# Patient Record
Sex: FEMALE | Race: WHITE | NOT HISPANIC OR LATINO | Employment: FULL TIME | URBAN - METROPOLITAN AREA
[De-identification: names, ages, dates, MRNs, and addresses within clinical notes are randomized per-mention and may not be internally consistent; named-entity substitution may affect disease eponyms.]

---

## 2018-11-08 ENCOUNTER — HOSPITAL ENCOUNTER (EMERGENCY)
Facility: CLINIC | Age: 53
Discharge: HOME | End: 2018-11-08
Attending: EMERGENCY MEDICINE | Admitting: EMERGENCY MEDICINE
Payer: COMMERCIAL

## 2018-11-08 ENCOUNTER — APPOINTMENT (OUTPATIENT)
Dept: GENERAL RADIOLOGY | Facility: CLINIC | Age: 53
End: 2018-11-08
Attending: EMERGENCY MEDICINE
Payer: COMMERCIAL

## 2018-11-08 VITALS
OXYGEN SATURATION: 98 % | HEART RATE: 73 BPM | SYSTOLIC BLOOD PRESSURE: 152 MMHG | TEMPERATURE: 97.3 F | RESPIRATION RATE: 118 BRPM | DIASTOLIC BLOOD PRESSURE: 95 MMHG | WEIGHT: 228 LBS

## 2018-11-08 DIAGNOSIS — S82.392A CLOSED FRACTURE OF POSTERIOR MALLEOLUS OF LEFT TIBIA, INITIAL ENCOUNTER: ICD-10-CM

## 2018-11-08 PROCEDURE — 96374 THER/PROPH/DIAG INJ IV PUSH: CPT

## 2018-11-08 PROCEDURE — 99285 EMERGENCY DEPT VISIT HI MDM: CPT | Mod: 25

## 2018-11-08 PROCEDURE — 27825 TREAT LOWER LEG FRACTURE: CPT | Mod: LT

## 2018-11-08 PROCEDURE — 73600 X-RAY EXAM OF ANKLE: CPT | Mod: LT

## 2018-11-08 PROCEDURE — 25000128 H RX IP 250 OP 636: Performed by: EMERGENCY MEDICINE

## 2018-11-08 PROCEDURE — 25000131 ZZH RX MED GY IP 250 OP 636 PS 637: Performed by: EMERGENCY MEDICINE

## 2018-11-08 PROCEDURE — 96361 HYDRATE IV INFUSION ADD-ON: CPT

## 2018-11-08 PROCEDURE — 40000986 XR ANKLE LT 2 VW: Mod: LT

## 2018-11-08 RX ORDER — BUPIVACAINE HYDROCHLORIDE 5 MG/ML
INJECTION, SOLUTION PERINEURAL
Status: DISCONTINUED
Start: 2018-11-08 | End: 2018-11-08 | Stop reason: HOSPADM

## 2018-11-08 RX ORDER — ONDANSETRON 4 MG/1
4 TABLET, ORALLY DISINTEGRATING ORAL ONCE
Status: COMPLETED | OUTPATIENT
Start: 2018-11-08 | End: 2018-11-08

## 2018-11-08 RX ORDER — SODIUM CHLORIDE 9 MG/ML
1000 INJECTION, SOLUTION INTRAVENOUS CONTINUOUS
Status: DISCONTINUED | OUTPATIENT
Start: 2018-11-08 | End: 2018-11-08 | Stop reason: HOSPADM

## 2018-11-08 RX ORDER — HYDROMORPHONE HYDROCHLORIDE 1 MG/ML
0.5 INJECTION, SOLUTION INTRAMUSCULAR; INTRAVENOUS; SUBCUTANEOUS
Status: DISCONTINUED | OUTPATIENT
Start: 2018-11-08 | End: 2018-11-08 | Stop reason: HOSPADM

## 2018-11-08 RX ORDER — OXYCODONE AND ACETAMINOPHEN 5; 325 MG/1; MG/1
1-2 TABLET ORAL EVERY 4 HOURS PRN
Qty: 12 TABLET | Refills: 0 | Status: SHIPPED | OUTPATIENT
Start: 2018-11-08

## 2018-11-08 RX ADMIN — Medication 0.5 MG: at 18:46

## 2018-11-08 RX ADMIN — ONDANSETRON 4 MG: 4 TABLET, ORALLY DISINTEGRATING ORAL at 18:45

## 2018-11-08 RX ADMIN — Medication 0.5 MG: at 19:07

## 2018-11-08 RX ADMIN — SODIUM CHLORIDE 1000 ML: 9 INJECTION, SOLUTION INTRAVENOUS at 18:46

## 2018-11-08 ASSESSMENT — ENCOUNTER SYMPTOMS: ARTHRALGIAS: 1

## 2018-11-08 NOTE — ED AVS SNAPSHOT
[facility] Emergency Department    [address]    [facility] [address]    Phone:  [phone]    Fax:  [phone]                                       Mirna Méndez   MRN: [id number]    Department:  [facility] Emergency Department   Date of Visit:  11/8/2018           Patient Information     Date Of Birth          1965        Your diagnoses for this visit were:     Closed fracture of posterior malleolus of left tibia, initial encounter        You were seen by Roselia London MD.      Follow-up Information     Follow up with [facility] In 2 days.    Contact information:    [address]  Medical Records  [facility] [address]  [phone]          Follow up with [facility] In 3 days.    Contact information:    [address], [facility] [address]  [facility] [address]  [phone]          Discharge Instructions         Leg Fracture    You have a break (fracture) of the leg. A fracture is treated with a splint, cast, or special boot. It will usually take at about 8 to 12 weeks for the fracture to heal, but it can take several months in some cases. If you have a severe injury, you may need surgery to fix it.  Home care  Follow these guidelines when caring for yourself at home:    You will be given a splint, cast, boot, or other device to keep the injured area from moving. Unless you were told otherwise, use crutches or a walker. Don t put weight on the injured leg until your healthcare provider says you can do so. (You can rent crutches and a walker at many pharmacies and surgical or orthopedic supply stores.)    Keep your leg elevated to reduce pain and swelling. When sleeping, put a pillow under the injured leg. When sitting, support the injured leg so it is above your waist. This is very important during the first 2 days (48 hours).    Put an ice pack on the injured area. Do this for 20 minutes every 1 to 2 hours the first day  for pain relief. You can make an ice pack by wrapping a plastic bag of ice cubes in a thin towel. As the ice melts, be careful that the cast, splint, or boot doesn t get wet. You can put the ice pack directly over the splint or cast. Continue using the ice pack 3 to 4 times a day for the next 2 days. Then use the ice pack as needed to ease pain and swelling.    Keep the cast, splint, or boot completely dry at all times. Bathe with your cast, splint, or boot out of the water. Protect it with a large plastic bag, rubber-banded at the top end. If a boot or fiberglass cast or splint gets wet, you can dry it with a hair dryer.    You may use acetaminophen or ibuprofen to control pain, unless another pain medicine was prescribed. If you have chronic liver or kidney disease, talk with your healthcare provider before using these medicines. Also talk with your provider if you ve had a stomach ulcer or gastrointestinal bleeding.    Don t put creams or objects under the cast if you have itching.  Follow-up care  Follow up with your healthcare provider, or as advised. This is to make sure the bone is healing the way it should. If a splint was put on, it may be converted to a cast at your next visit.  X-rays may be taken. You will be told of any new findings that may affect your care.  When to seek medical advice  Call your healthcare provider right away if any of these occur:    The cast or splint cracks    The plaster cast or splint becomes wet or soft    The fiberglass cast or splint stays wet for more than 24 hours    Bad odor from the cast or wound fluid stains the cast    Tightness or pain under the cast or splint gets worse    Toes become swollen, cold, blue, numb, or tingly    You can t move your toes    Skin around cast or splint becomes red    Fever of 100.4 F (38 C) or higher, or as directed by your healthcare provider  Date Last Reviewed: 2/1/2017 2000-2018 [employer]. [address] [facility],  [place] [address]. All rights reserved. This information is not intended as a substitute for professional medical care. Always follow your healthcare professional's instructions.          Discharge Instructions: Caring for Your Splint  You will be going home with a splint. This is sometimes called a removable cast. A splint helps your body heal by holding your injured bones or joints in place. Take good care of your splint. A damaged splint can keep your injury from healing well. If your splint becomes damaged or loses its shape, you may need to replace it.   You have a broken ___________________ bone.  This bone is located in your ____________.   Home care    Wear your splint according to your doctor s instructions.    Keep the splint dry at all times. Bathe with your splint well out of the water. You can hold the splint outside the tub or shower when bathing. Protect it with a large plastic bag closed at the top end with a rubber band. Use two layers of plastic to help keep the splint dry. Or you can buy a waterproof shield.    If a splint gets wet, dry it with a hair dryer on the  cool  setting. Don t use the warm or hot setting, because those settings can burn your skin.    Always keep the splint clean and away from dirt.    Wash the Velcro straps and inner cloth sleeve (stockinet) with soapy water and air dry.    Keep your splint away from open flames.    Don t expose your splint to heat, space heaters, or prolonged sunlight. Excessive heat will cause the splint to change shape.    Don t cut or tear the splint.     Exercise all the nearby joints not kept still by the splint. If you have a long leg splint, exercise your hip joint and your toes. If you have an arm splint, exercise your shoulder, elbow, thumb, and fingers.    Elevate the part of your body that is in the splint. This helps reduce swelling.  Follow-up care  Make a follow-up appointment with your healthcare provider, or as advised.  When to call your  healthcare provider  Call your healthcare provider right away if you have any of these:    Tingling or numbness in the affected area    Severe pain that cannot be relieved with medicine    Cast that feels too tight or too loose    Swelling, coldness, or blue-gray color in the fingers or toes    Cast that is damaged, cracked, or has rough edges that hurt    Pressure sores or red marks that don t go away within 1 hour after removing the splint    Blisters   Date Last Reviewed: 7/1/2016 2000-2018 [employer]. [address] [place] [address]. All rights reserved. This information is not intended as a substitute for professional medical care. Always follow your healthcare professional's instructions.          24 Hour Appointment Hotline       To make an appointment at any [facility], call [phone] ([phone]). If you don't have a family doctor or clinic, we will help you find one. [facility] are conveniently located to serve the needs of you and your family.             Review of your medicines      START taking        Dose / Directions Last dose taken    oxyCODONE-acetaminophen 5-325 MG per tablet   Commonly known as:  PERCOCET   Dose:  1-2 tablet   Quantity:  12 tablet        Take 1-2 tablets by mouth every 4 hours as needed for pain   Refills:  0          Our records show that you are taking the medicines listed below. If these are incorrect, please call your family doctor or clinic.        Dose / Directions Last dose taken    AMITRIPTYLINE HCL PO        Refills:  0        EFFEXOR PO        Take by mouth 3 times daily   Refills:  0        levothyroxine 25 mcg/mL Susp   Commonly known as:  SYNTHROID        Take by mouth every morning (before breakfast)   Refills:  0                Information about OPIOIDS     PRESCRIPTION OPIOIDS: WHAT YOU NEED TO KNOW   We gave you an opioid (narcotic) pain medicine. It is important to manage your pain, but opioids are not always the  best choice. You should first try all the other options your care team gave you. Take this medicine for as short a time (and as few doses) as possible.    Some activities can increase your pain, such as bandage changes or therapy sessions. It may help to take your pain medicine 30 to 60 minutes before these activities. Reduce your stress by getting enough sleep, working on hobbies you enjoy and practicing relaxation or meditation. Talk to your care team about ways to manage your pain beyond prescription opioids.    These medicines have risks:    DO NOT drive when on new or higher doses of pain medicine. These medicines can affect your alertness and reaction times, and you could be arrested for driving under the influence (DUI). If you need to use opioids long-term, talk to your care team about driving.    DO NOT operate heavy machinery    DO NOT do any other dangerous activities while taking these medicines.    DO NOT drink any alcohol while taking these medicines.     If the opioid prescribed includes acetaminophen, DO NOT take with any other medicines that contain acetaminophen. Read all labels carefully. Look for the word  acetaminophen  or  Tylenol.  Ask your pharmacist if you have questions or are unsure.    You can get addicted to pain medicines, especially if you have a history of addiction (chemical, alcohol or substance dependence). Talk to your care team about ways to reduce this risk.    All opioids tend to cause constipation. Drink plenty of water and eat foods that have a lot of fiber, such as fruits, vegetables, prune juice, apple juice and high-fiber cereal. Take a laxative (Miralax, milk of magnesia, Colace, Senna) if you don t move your bowels at least every other day. Other side effects include upset stomach, sleepiness, dizziness, throwing up, tolerance (needing more of the medicine to have the same effect), physical dependence and slowed breathing.    Store your pills in a secure place, locked if  possible. We will not replace any lost or stolen medicine. If you don t finish your medicine, please throw away (dispose) as directed by your pharmacist. The [place] [employer]ution Control Agency has more information about safe disposal: https://www.pca.state.[place]/living-green/managing-unwanted-medications        Prescriptions were sent or printed at these locations (1 Prescription)                   Other Prescriptions                Printed at Department/Unit printer (1 of 1)         oxyCODONE-acetaminophen (PERCOCET) 5-325 MG per tablet                Procedures and tests performed during your visit     Procedure/Test Number of Times Performed    XR Ankle Left 2 Views 2      Orders Needing Specimen Collection     None      Pending Results     Date and Time Order Name Status Description    11/8/2018 1927 XR Ankle Left 2 Views Preliminary     11/8/2018 1842 XR Ankle Left 2 Views Preliminary             Pending Culture Results     No orders found from 11/6/2018 to 11/9/2018.            Pending Results Instructions     If you had any lab results that were not finalized at the time of your Discharge, you can call the ED Lab Result RN at [phone]. You will be contacted by this team for any positive Lab results or changes in treatment. The nurses are available 7 days a week from 10A to 6:30P.  You can leave a message 24 hours per day and they will return your call.        Test Results From Your Hospital Stay        11/8/2018  7:06 PM      Narrative     LEFT ANKLE TWO VIEWS     11/8/2018 7:03 PM      HISTORY: Fall, deformity.    COMPARISON: None.        Impression     IMPRESSION: Mildly displaced fracture of the posterior malleolus of  the distal tibia. The ankle mortise is widened medially.                11/8/2018  7:57 PM      Narrative     LEFT ANKLE TWO VIEWS    11/8/2018 7:53 PM     COMPARISON: Prereduction three view left ankle same day.    HISTORY: Post reduction.        Impression     IMPRESSION: Left ankle is  now in a splint. Fracture through the  posterior malleolus again noted. Mild widening of the medial aspect of  the ankle mortise joint is noted but has improved significantly from  the prereduction images. No new fractures.                  Clinical Quality Measure: Blood Pressure Screening     Your blood pressure was checked while you were in the emergency department today. The last reading we obtained was  BP: (!) 152/95 . Please read the guidelines below about what these numbers mean and what you should do about them.  If your systolic blood pressure (the top number) is less than 120 and your diastolic blood pressure (the bottom number) is less than 80, then your blood pressure is normal. There is nothing more that you need to do about it.  If your systolic blood pressure (the top number) is 120-139 or your diastolic blood pressure (the bottom number) is 80-89, your blood pressure may be higher than it should be. You should have your blood pressure rechecked within a year by a primary care provider.  If your systolic blood pressure (the top number) is 140 or greater or your diastolic blood pressure (the bottom number) is 90 or greater, you may have high blood pressure. High blood pressure is treatable, but if left untreated over time it can put you at risk for heart attack, stroke, or kidney failure. You should have your blood pressure rechecked by a primary care provider within the next 4 weeks.  If your provider in the emergency department today gave you specific instructions to follow-up with your doctor or provider even sooner than that, you should follow that instruction and not wait for up to 4 weeks for your follow-up visit.        Thank you for choosing [facility]       Thank you for choosing [facility] for your care. Our goal is always to provide you with excellent care. Hearing back from our patients is one way we can continue to improve our services. Please take a few minutes to complete the written survey  "that you may receive in the mail after you visit with us. Thank you!        [employer] [employer] Information     [employer] lets you send messages to your doctor, view your test results, renew your prescriptions, schedule appointments and more. To sign up, go to [email or web address] . Click on \"Log in\" on the left side of the screen, which will take you to the Welcome page. Then click on \"Sign up Now\" on the right side of the page.     You will be asked to enter the access code listed below, as well as some personal information. Please follow the directions to create your username and password.     Your access code is: [email or web address]  Expires: 2019  9:04 PM     Your access code will  in 90 days. If you need help or a new code, please call your [facility] clinic or [phone].        Care EveryWhere ID     This is your Care EveryWhere ID. This could be used by other organizations to access your [facility] medical records  [id number]        Equal Access to Services     MARCUS MATSON : Hadraymond israelo Soannika, waaxda luqadaha, qaybta kaalmada alin, philipp owen. So [facility] [phone].    ATENCIÓN: Si habla español, tiene a flores disposición servicios gratuitos de asistencia lingüística. Llame al [phone].    We comply with applicable federal civil rights laws and [place] laws. We do not discriminate on the basis of race, color, national origin, age, disability, sex, sexual orientation, or gender identity.            After Visit Summary       This is your record. Keep this with you and show to your community pharmacist(s) and doctor(s) at your next visit.                  "

## 2018-11-08 NOTE — ED AVS SNAPSHOT
[facility] Emergency Department    [address]    [facility] [address]    Phone:  [phone]    Fax:  [phone]                                       Mirna Méndez   MRN: [id number]    Department:  [facility] Emergency Department   Date of Visit:  11/8/2018           After Visit Summary Signature Page     I have received my discharge instructions, and my questions have been answered. I have discussed any challenges I see with this plan with the nurse or doctor.    ..........................................................................................................................................  Patient/Patient Representative Signature      ..........................................................................................................................................  Patient Representative Print Name and Relationship to Patient    ..................................................               ................................................  Date                                   Time    ..........................................................................................................................................  Reviewed by Signature/Title    ...................................................              ..............................................  Date                                               Time          22EPIC Rev 08/18

## 2018-11-09 NOTE — ED TRIAGE NOTES
Pt presents with having left ankle pain slipped on the wet leaves, Now presents with left deformed ankle. ABC's intact A&Ox.4

## 2018-11-09 NOTE — ED PROVIDER NOTES
History     Chief Complaint:  Fall    HPI   Mirna Méndez is a 52 year old female who presents to the emergency department today with fall. The patient fell while walking her dog and hurt her left ankle with obvious deformity. She rates her pain as 2/10. She does report she always has foot numbness after spine surgery. She reports some numbness currently, but not new. She denies other injury. She is otherwise healthy. Denies any symptoms before the fall.     Allergies:  No Known Drug Allergies      Medications:    Amitriptyline   Synthroid   Effexor     Past Medical History:    Knee pain  Other postprocedural status     Past Surgical History:    Spine surgery    Family History:    History reviewed. No pertinent family history.     Social History:  The patient was alone.  Marital Status:       Review of Systems   Musculoskeletal: Positive for arthralgias (left ankle).   All other systems reviewed and are negative.    Physical Exam     Patient Vitals for the past 24 hrs:   BP Temp Pulse Heart Rate Resp SpO2 Weight   11/08/18 2015 - - - - - 100 % -   11/08/18 1906 - - - - - 99 % -   11/08/18 1905 (!) 152/95 - - - - - -   11/08/18 1833 (!) 163/101 97.3  F (36.3  C) 73 73 (!) 118 100 % 103.4 kg (228 lb)      Physical Exam    General: Patient is alert and interactive when I enter the room  Head:  The scalp, face, and head appear normal  Eyes:  Conjunctivae are normal  ENT:    The nose is normal    Pinnae are normal    External acoustic canals are normal  Neck:  Trachea midline  CV:  Pulses are normal.    Resp:  No respiratory distress   Abdomen:      Soft, non-tender, non-distended  Musc:  Normal muscular tone    Left ankle deformity with ankle twisted laterally     2+ DP pulse, able to move toes    No lacerations    Large effusion to left ankle  Skin:  No rash or lesions noted  Neuro:  Speech is normal and fluent. Face is symmetric.     Moving all extremities well.   Psych: Awake. Alert.  Normal affect.   Appropriate interactions.         Emergency Department Course   Imaging:  Radiology findings were communicated with the patient who voiced understanding of the findings.  Left Ankle XR  IMPRESSION: Mildly displaced fracture of the posterior malleolus of the distal tibia. The ankle mortise is widened medially.   Report per radiology      Left Ankle XR Repeat   IMPRESSION: Left ankle is now in a splint. Fracture through the posterior malleolus again noted. Mild widening of the medial aspect of the ankle mortise joint is noted but has improved significantly from  the prereduction images. No new fractures.    Report per radiology      Procedures:    Reduction     SITE:Left ankle     PROCEDURE PROVIDER: Dr. London        CONSENT: Risks , benefits, and  alternatives were discussed with   patient and consent for procedure was  obtained verbally.       Anesthesia: Left hematoma block with bupivacaine 1%  REDUCTION COMMENTS: The patient's left ankle was held in traction and internally rotated until achieved anatomic reduction. Post reductions X-rays were obtained and showed good reduction.       PATIENT STATUS: Dislocation alignment improved post procedure   and both sensation and circulation are intact     Procedure Note: Splint Placement  Physician: Roselia London MD  Diagnosis: left azam fracture.    Description of Procedure:  Using tech to assist, the left lower extremity was placed in a RJ splint with plaster material.  The neurovascular exam was normal before and after splint placement.  The patient tolerated the procedure well, there were no complications.       Interventions:  1845: Zofran 4mg IV   1846: 0.9% NaCl 1L IV Bolus   1907: Dilaudid 0.5mg IV      Emergency Department Course:  Nursing notes and vitals reviewed.  1840: I performed an exam of the patient as documented above.   The patient was sent for a Left Ankle XR while in the emergency department, results above.   Patient rechecked and updated.   The  patient was sent for a  Left Ankle XR while in the emergency department, results above.   2103: Findings and plan explained to the Patient. Patient discharged home with instructions regarding supportive care, medications, and reasons to return. The importance of close follow-up was reviewed. The patient was prescribed Percocet.    I personally reviewed the laboratory and imaging results with the Patient and answered all related questions prior to discharge.   Impression & Plan    Medical Decision Making:  Mirna Méndez is a 52 year old female who presents for evaluation of a fall.  This was clearly a mechanical fall, not syncope.  There were no prodromal symptoms so I doubt stroke, cardiac arrhythmia or other serious etiology.  Detailed exam shows a left ankle deformity. She is neurovascular intact. XR of Left Ankle showed mildly displaced fracture of the posterior malleolus of the distal tibia and medially widened ankle mortise. This was reduced as per procedure note above.  I had the tech ambulate the patient and she did well.  Based on this I would send home for outpatient management with orthopedics. With the widened joint, it possible she will get surgery. Instructed to be NWB..  I would not do workup for syncope, stroke, ACS, PE at this point.  I doubt serious underlying fractures, intracerebral issues, spinal fractures.      Diagnosis:    ICD-10-CM    1. Closed fracture of posterior malleolus of left tibia, initial encounter S82.392A        Disposition:  discharged to home    Discharge Medications:  New Prescriptions    OXYCODONE-ACETAMINOPHEN (PERCOCET) 5-325 MG PER TABLET    Take 1-2 tablets by mouth every 4 hours as needed for pain       Scribe Disclosure:  I, Hailey Jaramillo, am serving as a scribe at 6:40 PM on 11/8/2018 to document services personally performed by Roselia London MD based on my observations and the provider's statements to me.    11/8/2018   [facility] EMERGENCY  DEPARTMENT       Roselia London MD  11/12/18 0168

## 2018-11-09 NOTE — DISCHARGE INSTRUCTIONS
Leg Fracture    You have a break (fracture) of the leg. A fracture is treated with a splint, cast, or special boot. It will usually take at about 8 to 12 weeks for the fracture to heal, but it can take several months in some cases. If you have a severe injury, you may need surgery to fix it.  Home care  Follow these guidelines when caring for yourself at home:    You will be given a splint, cast, boot, or other device to keep the injured area from moving. Unless you were told otherwise, use crutches or a walker. Don t put weight on the injured leg until your healthcare provider says you can do so. (You can rent crutches and a walker at many pharmacies and surgical or orthopedic supply stores.)    Keep your leg elevated to reduce pain and swelling. When sleeping, put a pillow under the injured leg. When sitting, support the injured leg so it is above your waist. This is very important during the first 2 days (48 hours).    Put an ice pack on the injured area. Do this for 20 minutes every 1 to 2 hours the first day for pain relief. You can make an ice pack by wrapping a plastic bag of ice cubes in a thin towel. As the ice melts, be careful that the cast, splint, or boot doesn t get wet. You can put the ice pack directly over the splint or cast. Continue using the ice pack 3 to 4 times a day for the next 2 days. Then use the ice pack as needed to ease pain and swelling.    Keep the cast, splint, or boot completely dry at all times. Bathe with your cast, splint, or boot out of the water. Protect it with a large plastic bag, rubber-banded at the top end. If a boot or fiberglass cast or splint gets wet, you can dry it with a hair dryer.    You may use acetaminophen or ibuprofen to control pain, unless another pain medicine was prescribed. If you have chronic liver or kidney disease, talk with your healthcare provider before using these medicines. Also talk with your provider if you ve had a stomach ulcer or  gastrointestinal bleeding.    Don t put creams or objects under the cast if you have itching.  Follow-up care  Follow up with your healthcare provider, or as advised. This is to make sure the bone is healing the way it should. If a splint was put on, it may be converted to a cast at your next visit.  X-rays may be taken. You will be told of any new findings that may affect your care.  When to seek medical advice  Call your healthcare provider right away if any of these occur:    The cast or splint cracks    The plaster cast or splint becomes wet or soft    The fiberglass cast or splint stays wet for more than 24 hours    Bad odor from the cast or wound fluid stains the cast    Tightness or pain under the cast or splint gets worse    Toes become swollen, cold, blue, numb, or tingly    You can t move your toes    Skin around cast or splint becomes red    Fever of 100.4 F (38 C) or higher, or as directed by your healthcare provider  Date Last Reviewed: 2/1/2017 2000-2018 [employer]. [address] [place] [address]. All rights reserved. This information is not intended as a substitute for professional medical care. Always follow your healthcare professional's instructions.          Discharge Instructions: Caring for Your Splint  You will be going home with a splint. This is sometimes called a removable cast. A splint helps your body heal by holding your injured bones or joints in place. Take good care of your splint. A damaged splint can keep your injury from healing well. If your splint becomes damaged or loses its shape, you may need to replace it.   You have a broken ___________________ bone.  This bone is located in your ____________.   Home care    Wear your splint according to your doctor s instructions.    Keep the splint dry at all times. Bathe with your splint well out of the water. You can hold the splint outside the tub or shower when bathing. Protect it with a large plastic bag  closed at the top end with a rubber band. Use two layers of plastic to help keep the splint dry. Or you can buy a waterproof shield.    If a splint gets wet, dry it with a hair dryer on the  cool  setting. Don t use the warm or hot setting, because those settings can burn your skin.    Always keep the splint clean and away from dirt.    Wash the Velcro straps and inner cloth sleeve (stockinet) with soapy water and air dry.    Keep your splint away from open flames.    Don t expose your splint to heat, space heaters, or prolonged sunlight. Excessive heat will cause the splint to change shape.    Don t cut or tear the splint.     Exercise all the nearby joints not kept still by the splint. If you have a long leg splint, exercise your hip joint and your toes. If you have an arm splint, exercise your shoulder, elbow, thumb, and fingers.    Elevate the part of your body that is in the splint. This helps reduce swelling.  Follow-up care  Make a follow-up appointment with your healthcare provider, or as advised.  When to call your healthcare provider  Call your healthcare provider right away if you have any of these:    Tingling or numbness in the affected area    Severe pain that cannot be relieved with medicine    Cast that feels too tight or too loose    Swelling, coldness, or blue-gray color in the fingers or toes    Cast that is damaged, cracked, or has rough edges that hurt    Pressure sores or red marks that don t go away within 1 hour after removing the splint    Blisters   Date Last Reviewed: 7/1/2016 2000-2018 [employer]. [address] [place] [address], [place] [address]. All rights reserved. This information is not intended as a substitute for professional medical care. Always follow your healthcare professional's instructions.

## 2018-11-10 ENCOUNTER — HEALTH MAINTENANCE LETTER (OUTPATIENT)
Age: 53
End: 2018-11-10

## 2018-12-18 ENCOUNTER — THERAPY VISIT (OUTPATIENT)
Dept: PHYSICAL THERAPY | Facility: CLINIC | Age: 53
End: 2018-12-18
Payer: COMMERCIAL

## 2018-12-18 DIAGNOSIS — Z98.890 STATUS POST SURGERY: ICD-10-CM

## 2018-12-18 DIAGNOSIS — M25.572 ACUTE LEFT ANKLE PAIN: ICD-10-CM

## 2018-12-18 PROCEDURE — 97161 PT EVAL LOW COMPLEX 20 MIN: CPT | Mod: GP | Performed by: PHYSICAL THERAPIST

## 2018-12-18 PROCEDURE — 97110 THERAPEUTIC EXERCISES: CPT | Mod: GP | Performed by: PHYSICAL THERAPIST

## 2018-12-18 NOTE — LETTER
[facility] PHYSICAL THERAPY  [address]  [facility] [address]  [phone]    2018    Re: Mirna Méndez   :   1965  MRN:  [id number]   REFERRING PHYSICIAN:   Bora Burgos    [facility]THLETIC [facility] PHYSICAL [facility]  Date of Initial Evaluation:  18  Visits:  Rxs Used: 1  Reason for Referral:  Acute left ankle pain; Status post surgery    [facility] Initial Evaluation  Subjective:  The history is provided by the patient. No  was used.         Objective:  Gait:    Gait Type:  Antalgic   Weight Bearing Status:  WBAT   Assistive Devices:  CAM    Ankle/Foot Evaluation  ROM:    PROM:    Dorsiflexion:  Left:    2     Right:   20   Plantarflexion:  Left:    35     Right:  60  PALPATION: normal    Assessment/Plan:    Patient is a 52 year old female with left side ankle complaints.    Patient has the following significant findings with corresponding treatment plan.                Diagnosis 1:  S/p L ankle  ORIF  Pain -  self management, education, directional preference exercise and home program  Decreased ROM/flexibility - manual therapy and therapeutic exercise  Decreased strength - therapeutic exercise and therapeutic activities  Impaired balance - neuro re-education and therapeutic activities  Decreased proprioception - neuro re-education and therapeutic activities  Impaired muscle performance - neuro re-education  Decreased function - therapeutic activities    Therapy Evaluation Codes:   1) History comprised of:   Personal factors that impact the plan of care:      None.    Comorbidity factors that impact the plan of care are:      None.     Medications impacting care: Anti-depressant, Pain and Sleep.  2) Examination of Body Systems comprised of:   Body structures and functions that impact the plan of care:      Ankle.   Activity limitations that impact the plan of  care are:      Bending, Lifting, Stairs and Walking.  Re: Mirna Méndez   :   1965            3) Clinical presentation characteristics are:   Stable/Uncomplicated.  4) Decision-Making    Low complexity using standardized patient assessment instrument and/or measureable assessment of functional outcome.  Cumulative Therapy Evaluation is: Low complexity.    Previous and current functional limitations:  (See Goal Flow Sheet for this information)    Short term and Long term goals: (See Goal Flow Sheet for this information)     Communication ability:  Patient appears to be able to clearly communicate and understand verbal and written communication and follow directions correctly.  Treatment Explanation - The following has been discussed with the patient:   RX ordered/plan of care  Anticipated outcomes  Possible risks and side effects  This patient would benefit from PT intervention to resume normal activities.   Rehab potential is good.    Frequency:  1 X week, once daily  Duration:  for 8 weeks  Discharge Plan:  Achieve all LTG.  Independent in home treatment program.  Reach maximal therapeutic benefit.    Thank you for your referral.    INQUIRIES  Therapist: Tigre Roman, PT  [facility] PH[facility]ICAL THERAPY  [address] [place] [id number]  Phone: [phone]  Fax: [phone]

## 2018-12-18 NOTE — LETTER
[facility] ATHLETIC [facility] PHYSICAL THERAPY  [address]  [facility] [address]  [phone]    2018    Re: Mirna Méndez   :   1965  MRN:  [id number]   REFERRING PHYSICIAN:   Bora Burgos    [facility] ATHLETIC [facility] PHYSICAL THERAPY  Date of Initial Evaluation:  18  Visits:  Rxs Used: 1  Reason for Referral:  Acute left ankle pain; Status post surgery    EVALUATION SUMMARY    [facility] Initial Evaluation  Subjective:  The history is provided by the patient. No  was used.   Mirna Méndez is a 52 year old female with a left ankle condition.  Condition occurred with:  A fall/slip.  Condition occurred: at home.  This is a new condition  Pt had ORIF on 18 after falling and fracturing left ankle at home.      Patient reports pain:  Joint.  Radiates to:  Ankle.  Pain is described as aching and is intermittent and reported as 6/10.  Associated symptoms:  Loss of motion/stiffness and loss of strength. Pain is worse during the day.  Symptoms are exacerbated by activity, walking and standing and relieved by ice and rest.  Since onset symptoms are gradually improving.    Previous treatment includes surgery.  There was moderate improvement following previous treatment.  General health as reported by patient is good.  Pertinent medical history includes:  Depression and migraines/headaches.  Medical allergies: no.  Other surgeries include:  Orthopedic surgery.  Current medications:  Anti-depressants, pain medication and sleep medication.  Current occupation is Business owner.  Patient is working in normal job without restrictions.  Primary job tasks include:  Prolonged standing and repetitive tasks.  Barriers include:  None as reported by patient.                Objective:  Gait:    Gait Type:  Antalgic   Weight Bearing Status:  WBAT   Assistive Devices:  CAM    Ankle/Foot Evaluation  ROM:     PROM:    Dorsiflexion:  Left:    2     Right:   20   Plantarflexion:  Left:    35     Right:  60  PALPATION: normal    Assessment/Plan:    Patient is a 52 year old female with left side ankle complaints.    Patient has the following significant findings with corresponding treatment plan.                Diagnosis 1:  S/p L ankle  ORIF  Pain -  self management, education, directional preference exercise and home program  Decreased ROM/flexibility - manual therapy and therapeutic exercise  Decreased strength - therapeutic exercise and therapeutic activities  Impaired balance - neuro re-education and therapeutic activities  Re: Mirna Méndez   :   1965        Decreased proprioception - neuro re-education and therapeutic activities  Impaired muscle performance - neuro re-education  Decreased function - therapeutic activities    Therapy Evaluation Codes:   1) History comprised of:   Personal factors that impact the plan of care:      None.    Comorbidity factors that impact the plan of care are:      None.     Medications impacting care: Anti-depressant, Pain and Sleep.  2) Examination of Body Systems comprised of:   Body structures and functions that impact the plan of care:      Ankle.   Activity limitations that impact the plan of care are:      Bending, Lifting, Stairs and Walking.  3) Clinical presentation characteristics are:   Stable/Uncomplicated.  4) Decision-Making    Low complexity using standardized patient assessment instrument and/or measureable assessment of functional outcome.  Cumulative Therapy Evaluation is: Low complexity.    Previous and current functional limitations:  (See Goal Flow Sheet for this information)    Short term and Long term goals: (See Goal Flow Sheet for this information)     Communication ability:  Patient appears to be able to clearly communicate and understand verbal and written communication and follow directions correctly.  Treatment Explanation - The following has  been discussed with the patient:   RX ordered/plan of care  Anticipated outcomes  Possible risks and side effects  This patient would benefit from PT intervention to resume normal activities.   Rehab potential is good.    Frequency:  1 X week, once daily  Duration:  for 8 weeks  Discharge Plan:  Achieve all LTG.  Independent in home treatment program.  Reach maximal therapeutic benefit.    Thank you for your referral.    INQUIRIES  Therapist: Tigre Roman, PT  [facility]  [address] [place] [id number]  Phone: [phone]  Fax: [phone]

## 2018-12-18 NOTE — PROGRESS NOTES
[facility] Initial [facility]  Subjective:  The history is provided by the patient. No  was used.   Mirna Méndez is a 52 year old female with a left ankle condition.  Condition occurred with:  A fall/slip.  Condition occurred: at home.  This is a new condition  Pt had ORIF on 11/16/18 after falling and fracturing left ankle at home.  .    Patient reports pain:  Joint.  Radiates to:  Ankle.  Pain is described as aching and is intermittent and reported as 6/10.  Associated symptoms:  Loss of motion/stiffness and loss of strength. Pain is worse during the day.  Symptoms are exacerbated by activity, walking and standing and relieved by ice and rest.  Since onset symptoms are gradually improving.    Previous treatment includes surgery.  There was moderate improvement following previous treatment.  General health as reported by patient is good.  Pertinent medical history includes:  Depression and migraines/headaches.  Medical allergies: no.  Other surgeries include:  Orthopedic surgery.  Current medications:  Anti-depressants, pain medication and sleep medication.  Current occupation is Business owner  .  Patient is working in normal job without restrictions.  Primary job tasks include:  Prolonged standing and repetitive tasks.    Barriers include:  None as reported by patient.                            Objective:    Gait:    Gait Type:  Antalgic   Weight Bearing Status:  WBAT   Assistive Devices:  CAM            Ankle/Foot Evaluation  ROM:      PROM:    Dorsiflexion:  Left:    2     Right:   20   Plantarflexion:  Left:    35     Right:  60                    PALPATION: normal                                                          General     ROS    Assessment/Plan:    Patient is a 52 year old female with left side ankle complaints.    Patient has the following significant findings with corresponding treatment plan.                Diagnosis 1:  S/p L ankle  ORIF  Pain -  self  management, education, directional preference exercise and home program  Decreased ROM/flexibility - manual therapy and therapeutic exercise  Decreased strength - therapeutic exercise and therapeutic activities  Impaired balance - neuro re-education and therapeutic activities  Decreased proprioception - neuro re-education and therapeutic activities  Impaired muscle performance - neuro re-education  Decreased function - therapeutic activities    Therapy Evaluation Codes:   1) History comprised of:   Personal factors that impact the plan of care:      None.    Comorbidity factors that impact the plan of care are:      None.     Medications impacting care: Anti-depressant, Pain and Sleep.  2) Examination of Body Systems comprised of:   Body structures and functions that impact the plan of care:      Ankle.   Activity limitations that impact the plan of care are:      Bending, Lifting, Stairs and Walking.  3) Clinical presentation characteristics are:   Stable/Uncomplicated.  4) Decision-Making    Low complexity using standardized patient assessment instrument and/or measureable assessment of functional outcome.  Cumulative Therapy Evaluation is: Low complexity.    Previous and current functional limitations:  (See Goal Flow Sheet for this information)    Short term and Long term goals: (See Goal Flow Sheet for this information)     Communication ability:  Patient appears to be able to clearly communicate and understand verbal and written communication and follow directions correctly.  Treatment Explanation - The following has been discussed with the patient:   RX ordered/plan of care  Anticipated outcomes  Possible risks and side effects  This patient would benefit from PT intervention to resume normal activities.   Rehab potential is good.    Frequency:  1 X week, once daily  Duration:  for 8 weeks  Discharge Plan:  Achieve all LTG.  Independent in home treatment program.  Reach maximal therapeutic benefit.    Please  refer to the daily flowsheet for treatment today, total treatment time and time spent performing 1:1 timed codes.

## 2019-01-17 PROBLEM — Z98.890 STATUS POST SURGERY: Status: RESOLVED | Noted: 2018-12-18 | Resolved: 2019-01-17

## 2019-01-17 PROBLEM — M25.572 ACUTE LEFT ANKLE PAIN: Status: RESOLVED | Noted: 2018-12-18 | Resolved: 2019-01-17

## 2022-05-20 ENCOUNTER — THERAPY VISIT (OUTPATIENT)
Dept: PHYSICAL THERAPY | Facility: CLINIC | Age: 57
End: 2022-05-20
Payer: COMMERCIAL

## 2022-05-20 DIAGNOSIS — M54.2 NECK PAIN: ICD-10-CM

## 2022-05-20 PROCEDURE — 97110 THERAPEUTIC EXERCISES: CPT | Mod: GP | Performed by: PHYSICAL THERAPIST

## 2022-05-20 PROCEDURE — 97161 PT EVAL LOW COMPLEX 20 MIN: CPT | Mod: GP | Performed by: PHYSICAL THERAPIST

## 2022-05-20 NOTE — PROGRESS NOTES
[facility]    OUTPATIENT Physical Therapy ORTHOPEDIC EVALUATION  PLAN OF TREATMENT FOR OUTPATIENT REHABILITATION  (COMPLETE FOR INITIAL CLAIMS ONLY)  Patient's Last Name, First Name, M.I.  YOB: 1965  Mirna Méndez    Provider s Name:  [facility]   Medical Record No.  [id number]   Start of Care Date:  05/20/22   Onset Date:   04/13/22   Type:     _X__PT   ___OT Medical Diagnosis:    Encounter Diagnosis   Name Primary?    Neck pain         Treatment Diagnosis:  R neck pain with R UE radiculopathy        Goals:     05/20/22 0500   Body Part   Goals listed below are for shoulder/neck pain   Goal #1   Goal #1 driving/transportation   Previous Functional Level No restrictions   Current Functional Level Drives using side and rearview mirrors   Performance Level 20 min with pain 9/10   STG Target Performance Able to look over either shoulder    Performance Level pain 5/10   Rationale for safe driving   Due date 06/17/22   LTG Target Performance Able to look over either shoulder    Performance Level pain 2/10   Rationale for safe driving   Due date 07/15/22       Therapy Frequency:  1 x week  Predicted Duration of Therapy Intervention:  8 weeks    Tigre Roman, LISBET                 I CERTIFY THE NEED FOR THESE SERVICES FURNISHED UNDER        THIS PLAN OF TREATMENT AND WHILE UNDER MY CARE .             Physician Signature               Date    X_____________________________________________________                         Certification Date From:  05/20/22   Certification Date To:  07/15/22    Referring Provider:  Francis Wood MD    Initial Assessment        See Epic Evaluation SOC Date: 05/20/22

## 2022-05-20 NOTE — PROGRESS NOTES
Physical Therapy Initial Evaluation  Subjective:  The history is provided by the patient. No  was used.   Patient Health History  Mirna Méndez being seen for R neck pain .     Date of Onset: 3 months ago.   Problem occurred: Repetative use, cooking   Pain is reported as 0/10 on pain scale.  General health as reported by patient is good.  Pertinent medical history includes: depression, mental illness, migraines/headaches and thyroid problems.   Red flags:  Foot drop.  Medical allergies: none.   Surgeries include:  Orthopedic surgery. Other surgery history details: foot, ankle knee and back.    Current medications:  Anti-depressants, pain medication, sleep medication and thyroid medication.    Current occupation is none.   Primary job tasks include:  Computer work, prolonged standing and repetitive tasks.                  Therapist Generated HPI Evaluation  Problem details: Pt. complains of Right neck pain with R UE radiculopathy that has been present for 3 months.  No known trauma.  PT order dated 4/13/22.      .         Type of problem:  Cervical spine.    This is a chronic condition.  Condition occurred with:  Insidious onset.  Where condition occurred: for unknown reasons.  Patient reports pain:  Cervical right side.  Pain is described as aching and is intermittent.  Pain radiates to:  Hand right. Pain is worse during the day.  Since onset symptoms are unchanged.  Associated symptoms:  Loss of motion/stiffness and loss of strength. Symptoms are exacerbated by looking up or down, driving and rotating head  and relieved by rest.      Barriers include:  None as reported by patient.                        Objective:  Standing Alignment:    Cervical/Thoracic:  Forward head  Shoulder/UE:  Rounded shoulders                  Flexibility/Screens:   Positive screens:  CervicalNegative screens: Shoulder       Spine:  Decreased left spine flexibility:  Levator    Decreased right spine flexibility:   Levator                  Cervical/Thoracic Evaluation    AROM:  AROM Cervical:    Flexion:            100%  Extension:       75% with R neck pian  Rotation:         Left: 100%     Right: 75% with ERP  Side Bend:      Left: 100%     Right:  50% with increased R UE sx's      Headaches: cervical  Cervical Myotomes:  normal                    Neural Tension:    Left side:  Median positive.  Left side:  Radial and Ulnar  negative.    Right side:  Radial; Ulnar and Median  negative.  Cervical Dermatomes:  normal                    Cervical Palpation:  normal      Functional Tests:  normal        Cord Sign:  normal                                            General     ROS    Assessment/Plan:    Patient is a 56 year old female with cervical complaints.    Patient has the following significant findings with corresponding treatment plan.                Diagnosis 1: R neck pain with R UE radiculopathy  Pain -  self management, education, directional preference exercise and home program  Decreased ROM/flexibility - manual therapy and therapeutic exercise  Decreased joint mobility - manual therapy and therapeutic exercise  Decreased strength - therapeutic exercise and therapeutic activities  Impaired muscle performance - neuro re-education  Decreased function - therapeutic activities    Therapy Evaluation Codes:   1) Clinical presentation characteristics are:   Stable/Uncomplicated.  2) Decision-Making    Low complexity using standardized patient assessment instrument and/or measureable assessment of functional outcome.  Cumulative Therapy Evaluation is: Low complexity.    Previous and current functional limitations:  (See Goal Flow Sheet for this information)    Short term and Long term goals: (See Goal Flow Sheet for this information)     Communication ability:  Patient appears to be able to clearly communicate and understand verbal and written communication and follow directions correctly.  Treatment Explanation - The  following has been discussed with the patient:   RX ordered/plan of care  Anticipated outcomes  Possible risks and side effects  This patient would benefit from PT intervention to resume normal activities.   Rehab potential is good.    Frequency:  1 X week, once daily  Duration:  for 8 weeks  Discharge Plan:  Achieve all LTG.  Independent in home treatment program.  Reach maximal therapeutic benefit.    Please refer to the daily flowsheet for treatment today, total treatment time and time spent performing 1:1 timed codes.

## 2022-06-09 ENCOUNTER — THERAPY VISIT (OUTPATIENT)
Dept: PHYSICAL THERAPY | Facility: CLINIC | Age: 57
End: 2022-06-09
Payer: COMMERCIAL

## 2022-06-09 DIAGNOSIS — M54.2 NECK PAIN: Primary | ICD-10-CM

## 2022-06-09 PROCEDURE — 97140 MANUAL THERAPY 1/> REGIONS: CPT | Mod: GP | Performed by: PHYSICAL THERAPIST

## 2022-06-09 PROCEDURE — 97110 THERAPEUTIC EXERCISES: CPT | Mod: GP | Performed by: PHYSICAL THERAPIST

## 2022-06-20 ENCOUNTER — THERAPY VISIT (OUTPATIENT)
Dept: PHYSICAL THERAPY | Facility: CLINIC | Age: 57
End: 2022-06-20
Payer: COMMERCIAL

## 2022-06-20 DIAGNOSIS — M54.2 NECK PAIN: Primary | ICD-10-CM

## 2022-06-20 PROCEDURE — 97012 MECHANICAL TRACTION THERAPY: CPT | Mod: GP | Performed by: PHYSICAL THERAPIST

## 2022-06-20 PROCEDURE — 97110 THERAPEUTIC EXERCISES: CPT | Mod: GP | Performed by: PHYSICAL THERAPIST

## 2022-06-20 NOTE — PROGRESS NOTES
PROGRESS  REPORT    Progress reporting period is from eval to 6/20/22.       SUBJECTIVE  Subjective changes noted by patient:  .  Subjective: No sx's in past week.    Current pain level is  Current Pain level: 0/10.     Previous pain level was   Initial Pain level: 9/10.   Changes in function:  Yes (See Goal flowsheet attached for changes in current functional level)  Adverse reaction to treatment or activity: None    OBJECTIVE  Changes noted in objective findings:  Yes,   Objective: RIKY WNL.     ASSESSMENT/PLAN  Updated problem list and treatment plan: Diagnosis 1:  Neck pain  Decreased ROM/flexibility - manual therapy and therapeutic exercise  STG/LTGs have been met or progress has been made towards goals:  Yes (See Goal flow sheet completed today.)  Assessment of Progress: The patient's condition is improving.  The patient's condition has potential to improve.  Self Management Plans:  Patient has been instructed in a home treatment program.  Patient is independent in a home treatment program.  I have re-evaluated this patient and find that the nature, scope, duration and intensity of the therapy is appropriate for the medical condition of the patient.      Recommendations:  This patient is ready to be discharged from therapy and continue their home treatment program.    Please refer to the daily flowsheet for treatment today, total treatment time and time spent performing 1:1 timed codes.

## 2024-05-01 ENCOUNTER — TRANSCRIBE ORDERS (OUTPATIENT)
Dept: ORTHOPEDICS | Facility: CLINIC | Age: 59
End: 2024-05-01
Payer: COMMERCIAL

## 2024-05-01 DIAGNOSIS — Z98.890 S/P FOOT SURGERY: Primary | ICD-10-CM

## 2024-05-20 ENCOUNTER — THERAPY VISIT (OUTPATIENT)
Dept: PHYSICAL THERAPY | Facility: CLINIC | Age: 59
End: 2024-05-20
Payer: COMMERCIAL

## 2024-05-20 DIAGNOSIS — Z98.890 S/P FOOT SURGERY: ICD-10-CM

## 2024-05-20 PROBLEM — M25.572 ACUTE LEFT ANKLE PAIN: Status: ACTIVE | Noted: 2018-12-18

## 2024-05-20 PROCEDURE — 97161 PT EVAL LOW COMPLEX 20 MIN: CPT | Mod: GP | Performed by: PHYSICAL THERAPIST

## 2024-05-20 PROCEDURE — 97110 THERAPEUTIC EXERCISES: CPT | Mod: GP | Performed by: PHYSICAL THERAPIST

## 2024-05-20 PROCEDURE — 97116 GAIT TRAINING THERAPY: CPT | Mod: GP | Performed by: PHYSICAL THERAPIST

## 2024-05-20 NOTE — PROGRESS NOTES
PHYSICAL THERAPY EVALUATION  Type of Visit: Evaluation    See electronic medical record for Abuse and Falls Screening details.    Subjective  Pt. complains of left foot pain, decreased range of motion and weakness that has been present medial navicular and medial cuneiform partial excision on 4/12/24 and 2 irrigation and debridement procedures on 5/1/2024 and 5/10/2024 following infection in her foot.  Symptoms are exacerbated by walking, stairs and standing and driving.  Symptoms are relieved by  rest and wearing CAM boot.   Current function restrictions:  walking, stairs and standing and driving.  Previous functional status as reported by patient: Patient had left posterior tib transfer to supplement loss of anterior tib strength after low back surgery greater than 10 years ago.  She presents to the clinic today ambulating with crutches and Cam boot.  She is sleeping in a dorsi wedge that helps keep her left foot into dorsiflexion.        Presenting condition or subjective complaint: left foot pain  Date of onset: 05/10/24 (DOS)    Relevant medical history: Depression; Foot drop; Thyroid problems   Dates & types of surgery:      Prior diagnostic imaging/testing results: MRI; CT scan; X-ray     Prior therapy history for the same diagnosis, illness or injury: No      Prior Level of Function  Transfers: Independent  Ambulation: Independent  ADL: Independent  IADL:     Living Environment  Social support: With family members   Type of home: [facility]   Stairs to enter the home: Yes       Ramp: No   Stairs inside the home: Yes   Is there a railing: Yes   Help at home: Assist for driving and community activities  Equipment owned: Straight Cane; Walker with wheels; Crutches     Employment: Yes self employed  Hobbies/Interests:      Patient goals for therapy: walk and gain independence    Pain assessment: Pain present 4/10     Objective   FOOT/ANKLE EVALUATION    INTEGUMENTARY (edema, incisions):  Open wound on dorsum of  foot approximately 5 mm in diameter  POSTURE:   GAIT:   Weightbearing Status: WBAT with CAM boot  Assistive Device(s): Crutches (bilateral)  Gait Deviations: Meli decreased  Decreased step through gait with right foot  BALANCE/PROPRIOCEPTION:   WEIGHT BEARING ALIGNMENT:   NON-WEIGHTBEARING ALIGNMENT:    ROM:  Left ankle passive range of motion: 3-0-34  Substantial edema noted in left foot and ankle    Assessment & Plan   CLINICAL IMPRESSIONS  Medical Diagnosis: s/p partial medial cuneiform and partial navicular excision and 2 irrigation and debridement surgeries following infection    Treatment Diagnosis: s/p partial medial cuneiform and partial navicular excision with decreased ROM, strength along with gait pathology   Impression/Assessment: Patient is a 58 year old female with left foot and ankle pain and decreased range of motion following multiple surgeries.  The following significant findings have been identified: Pain, Decreased ROM/flexibility, Decreased joint mobility, Decreased strength, Impaired balance, Decreased proprioception, Inflammation, Edema, Impaired gait, Impaired muscle performance, and Decreased activity tolerance. These impairments interfere with their ability to perform self care tasks, recreational activities, household chores, household mobility, and community mobility as compared to previous level of function.     Clinical Decision Making (Complexity):  Clinical Presentation: Stable/Uncomplicated  Clinical Presentation Rationale: based on medical and personal factors listed in PT evaluation  Clinical Decision Making (Complexity): Low complexity    PLAN OF CARE  Treatment Interventions:  Modalities: Cryotherapy  Interventions: Gait Training, Manual Therapy, Neuromuscular Re-education, Therapeutic Activity, Therapeutic Exercise    Long Term Goals     PT Goal 1  Goal Description: Pt will be able to tolerate walking for 20 minutes in CAM  Rationale: to maximize safety and independence with  performance of ADLs and functional tasks;to maximize safety and independence within the home;to maximize safety and independence within the community  Target Date: 06/17/24  PT Goal 2  Goal Description: Pt will be able to tolerate walking for 120 minutes without pain  Rationale: to maximize safety and independence with performance of ADLs and functional tasks;to maximize safety and independence within the home;to maximize safety and independence within the community  Target Date: 07/01/24      Frequency of Treatment: 1-2 x week  Duration of Treatment: 6 weeks    Recommended Referrals to Other Professionals:   Education Assessment:   Learner/Method: Patient;Listening;Demonstration;Pictures/Video  Education Comments: Pt educated on plan of care    Risks and benefits of evaluation/treatment have been explained.   Patient/Family/caregiver agrees with Plan of Care.     Evaluation Time:     PT Eval, Low Complexity Minutes (97285): 15       Signing Clinician: Tigre Roman PT      [facility]                                                                                   OUTPATIENT PHYSICAL THERAPY      PLAN OF TREATMENT FOR OUTPATIENT REHABILITATION   Patient's Last Name, First Name, Mirna Ontiveros YOB: 1965   Provider's Name   [facility]   Medical Record No.  [id number]     Onset Date: 05/10/24 (DOS)  Start of Care Date: 05/20/24     Medical Diagnosis:  s/p partial medial cuneiform and partial navicular excision and 2 irrigation and debridement surgeries following infection      PT Treatment Diagnosis:  s/p partial medial cuneiform and partial navicular excision with decreased ROM, strength along with gait pathology Plan of Treatment  Frequency/Duration: 1-2 x week/ 6 weeks    Certification date from 05/20/24 to 08/12/24         See note for plan of treatment details and functional goals     Tigre Roman, PT                          I CERTIFY THE NEED FOR THESE SERVICES FURNISHED UNDER        THIS PLAN OF TREATMENT AND WHILE UNDER MY CARE .             Physician Signature               Date    X_____________________________________________________                  Referring Provider:  Bora Burgos    Initial Assessment  See Epic Evaluation- Start of Care Date: 05/20/24

## 2024-05-30 ENCOUNTER — THERAPY VISIT (OUTPATIENT)
Dept: PHYSICAL THERAPY | Facility: CLINIC | Age: 59
End: 2024-05-30
Payer: COMMERCIAL

## 2024-05-30 DIAGNOSIS — M25.572 ACUTE LEFT ANKLE PAIN: Primary | ICD-10-CM

## 2024-05-30 DIAGNOSIS — Z98.890 STATUS POST SURGERY: ICD-10-CM

## 2024-05-30 PROCEDURE — 97110 THERAPEUTIC EXERCISES: CPT | Mod: GP | Performed by: PHYSICAL THERAPIST

## 2024-05-30 PROCEDURE — 97140 MANUAL THERAPY 1/> REGIONS: CPT | Mod: GP | Performed by: PHYSICAL THERAPIST

## 2024-05-30 PROCEDURE — 97116 GAIT TRAINING THERAPY: CPT | Mod: GP | Performed by: PHYSICAL THERAPIST

## 2024-06-27 ENCOUNTER — THERAPY VISIT (OUTPATIENT)
Dept: PHYSICAL THERAPY | Facility: CLINIC | Age: 59
End: 2024-06-27
Payer: COMMERCIAL

## 2024-06-27 DIAGNOSIS — Z98.890 STATUS POST SURGERY: ICD-10-CM

## 2024-06-27 DIAGNOSIS — M25.572 ACUTE LEFT ANKLE PAIN: Primary | ICD-10-CM

## 2024-06-27 PROCEDURE — 97140 MANUAL THERAPY 1/> REGIONS: CPT | Mod: GP | Performed by: PHYSICAL THERAPIST

## 2024-06-27 PROCEDURE — 97110 THERAPEUTIC EXERCISES: CPT | Mod: GP | Performed by: PHYSICAL THERAPIST

## 2024-07-01 NOTE — ED NOTES
Bed: ED29  Expected date: 11/8/18  Expected time: 6:08 PM  Means of arrival: Ambulance  Comments:  Ajit Boggs   no

## 2024-07-30 ENCOUNTER — THERAPY VISIT (OUTPATIENT)
Dept: PHYSICAL THERAPY | Facility: CLINIC | Age: 59
End: 2024-07-30
Payer: COMMERCIAL

## 2024-07-30 DIAGNOSIS — M25.572 ACUTE LEFT ANKLE PAIN: Primary | ICD-10-CM

## 2024-07-30 DIAGNOSIS — Z98.890 STATUS POST SURGERY: ICD-10-CM

## 2024-07-30 PROCEDURE — 99207 PR NO CHARGE LOS: CPT | Mod: GP | Performed by: PHYSICAL THERAPIST

## 2024-08-04 ENCOUNTER — HEALTH MAINTENANCE LETTER (OUTPATIENT)
Age: 59
End: 2024-08-04